# Patient Record
Sex: FEMALE | Race: WHITE | Employment: FULL TIME | URBAN - METROPOLITAN AREA
[De-identification: names, ages, dates, MRNs, and addresses within clinical notes are randomized per-mention and may not be internally consistent; named-entity substitution may affect disease eponyms.]

---

## 2020-09-11 ENCOUNTER — HOSPITAL ENCOUNTER (OUTPATIENT)
Dept: GENERAL RADIOLOGY | Age: 30
Discharge: HOME | End: 2020-09-11
Attending: FAMILY MEDICINE
Payer: COMMERCIAL

## 2020-09-11 DIAGNOSIS — R05.9 COUGH: ICD-10-CM

## 2020-09-11 PROCEDURE — 71046 X-RAY EXAM CHEST 2 VIEWS: CPT

## 2021-05-25 ENCOUNTER — OFFICE VISIT (OUTPATIENT)
Dept: ENDOCRINOLOGY | Age: 31
End: 2021-05-25
Payer: COMMERCIAL

## 2021-05-25 VITALS
WEIGHT: 171.4 LBS | OXYGEN SATURATION: 98 % | BODY MASS INDEX: 27.55 KG/M2 | HEART RATE: 76 BPM | SYSTOLIC BLOOD PRESSURE: 104 MMHG | DIASTOLIC BLOOD PRESSURE: 70 MMHG | HEIGHT: 66 IN | RESPIRATION RATE: 16 BRPM

## 2021-05-25 DIAGNOSIS — R76.8 ELEVATED ANTINUCLEAR ANTIBODY (ANA) LEVEL: ICD-10-CM

## 2021-05-25 DIAGNOSIS — R79.89 ELEVATED CORTISOL LEVEL: ICD-10-CM

## 2021-05-25 DIAGNOSIS — R79.89 ELEVATED SERUM FREE T4 LEVEL: ICD-10-CM

## 2021-05-25 DIAGNOSIS — R79.89 ELEVATED CORTISOL LEVEL: Primary | ICD-10-CM

## 2021-05-25 PROCEDURE — 99204 OFFICE O/P NEW MOD 45 MIN: CPT | Performed by: INTERNAL MEDICINE

## 2021-05-25 RX ORDER — MONTELUKAST SODIUM 10 MG/1
10 TABLET ORAL DAILY
COMMUNITY

## 2021-05-25 NOTE — PROGRESS NOTES
Chief Complaint   Patient presents with    New Patient    Thyroid Problem    Adrenal Problem     History of Present Illness: Graeme Leyva is a 26 y.o. female seen in self referral for discussion related to thyroid/adrenal dysfunction. Pauline Monroy initially presented in 2015 with symptoms of palpitations. At that time TSH was 0.2, free T4 was 0.9, total T3 was 243. Repeat labs done 2 weeks later showed a normal TSH of 2.2 free T4 1.15, total T3 of 76. Labs were rechecked until 3 years later at which point TSH was normal as was free T4.  1 year later in March 2019, TSH was normal as was free T4, thyroperoxidase and thyroglobulin antibodies were normal but she was begun on a combination T3/T4- gave her palpitations- 2.5mcg T3 15mcg of T4 04/2019.    2019 Saw functional medicine doctor who told her adrenals were low. Was never told how to get out of the cycle of fatigue, hair loss, cold extremities. July 2020, TSH was normal with a high T4 at 2.11.  TSI was normal in 2015. Repeat TSH in September 2020 was normal.    In August of 2019 saw [facility]- recommended Endo. Joint pains in the hands- typing on the computer. Has never taken biotin or iron. Went vegan in 2019, worked at first then energy worsened. Sleep is hit or miss- taking melatonin- 2mg. Goes to sleep at 9AM wakes up at 0700- wakes up at [facility] back to sleep. NO snoring. Does not wake up feeling rested. History reviewed. No pertinent past medical history. History reviewed. No pertinent surgical history. Current Outpatient Medications   Medication Sig    montelukast (Singulair) 10 mg tablet Take 10 mg by mouth daily. No current facility-administered medications for this visit.    biotin     No Known Allergies  Family History   Problem Relation Age of Onset    Hypertension Mother     Heart Disease Mother     Heart Attack Father     Anemia Father     No Known Problems Sister    Grandmother with Hashimoto's     Social Hx: Works at a [employer] job  From [employer] [facility]:  - Constitutional Symptoms: Severe fatigue  - Eyes: no blurry vision or double vision  - Cardiovascular: no chest pain or palpitations  - Respiratory: no cough or shortness of breath  - Gastrointestinal: no dysphagia or abdominal pain  - Musculoskeletal: Has joint pain in the hands  - Integumentary: Hair changes  - Neurological: no numbness, tingling, or headaches  - Psychiatric: no depression or anxiety  - Endocrine: no heat or cold intolerance, no polyuria or polydipsia    - Physical Examination:  Visit Vitals  /70   Pulse 76   Resp 16   Ht 5' 6\" (1.676 m)   Wt 171 lb 6.4 oz (77.7 kg)   SpO2 98%   BMI 27.66 kg/m²   -   - - GENERAL: NCAT, Appears well nourished   - EYES: EOMI, non-icteric, no proptosis   - Ear/Nose/Throat: NCAT, no visible inflammation or masses   - CARDIOVASCULAR: no cyanosis, no visible JVD   - RESPIRATORY: respiratory effort normal without any distress or labored breathing   - MUSCULOSKELETAL: Normal ROM of neck and upper extremities observed   - SKIN: No rash on face  - NEUROLOGIC:  No facial asymmetry (Cranial nerve 7 motor function), No gaze palsy   - PSYCHIATRIC: Normal affect, Normal insight and judgement     Data Reviewed: Please see scanned docs for full laboratory evaluation abnormalities include an CON titer of 1:640 homogeneous and speckled  Random a.m. cortisol of 20, iron level of 146 mcg/dL with normal ferritin  Multiple rheumatologic labs    Assessment/Plan: This is a very pleasant 58-year-old female with a past medical history significant for chronic fatigue seen in referral from ENT for discussion related to thyroid function tests and adrenal functioning. We discussed the fact that she likely had a thyroiditis in 2015 that resolved quickly. She has neither thyroperoxidase nor thyroid-stimulating immunoglobulin levels making either Hashimoto's or Graves' disease very unlikely albeit not impossible.   Will definitively rule out excess cortisol with a 24-hour urine cortisol level. I cannot explain the elevated CON titer or the elevated iron levels. Ferritin was normal.  There is no family history of hemochromatosis. Hepatic function test are normal.      1. Elevated cortisol level  -Do not expect to see elevated 24-hour urine cortisol level  - CORTISOL, URINE FREE 24 HR; Future  - CREATININE, UR, 24 HR; Future    2. Elevated serum free T4 level  -On one occasion in the setting of a normal TSH in September, reassess  - THYROID PEROXIDASE (TPO) AB; Future  - THYROGLOBULIN AB; Future  - THYROID STIMULATING IMMUNOGLOBULIN; Future  - T4, FREE; Future  - T3 TOTAL; Future    3. Elevated antinuclear antibody (CON) level  -Etiology is very unclear at this time, explained that this is a nonspecific lab  - FERRITIN; Future  - REFERRAL TO RHEUMATOLOGY; Future    Copy sent to:Carmella Butler MD    Return to care: August 6 at [address] [place] [address]       An electronic signature was used to authenticate this note.

## 2021-05-26 DIAGNOSIS — R76.8 ELEVATED ANTINUCLEAR ANTIBODY (ANA) LEVEL: Primary | ICD-10-CM

## 2021-05-26 LAB
FERRITIN SERPL-MCNC: 87 NG/ML (ref 15–150)
FERRITIN SERPL-MCNC: 87 NG/ML (ref 15–150)
T3 SERPL-MCNC: 95 NG/DL (ref 71–180)
T3 SERPL-MCNC: 95 NG/DL (ref 71–180)
T4 FREE SERPL-MCNC: 1.29 NG/DL (ref 0.82–1.77)
T4 FREE SERPL-MCNC: 1.29 NG/DL (ref 0.82–1.77)
THYROGLOB AB SERPL-ACNC: <1 IU/ML (ref 0–0.9)
THYROGLOB AB SERPL-ACNC: <1 IU/ML (ref 0–0.9)
THYROPEROXIDASE AB SERPL-ACNC: <9 IU/ML (ref 0–34)
THYROPEROXIDASE AB SERPL-ACNC: <9 IU/ML (ref 0–34)
TSI SER-ACNC: <0.1 IU/L (ref 0–0.55)
TSI SER-ACNC: NORMAL

## 2021-05-27 ENCOUNTER — TRANSCRIBE ORDER (OUTPATIENT)
Dept: SCHEDULING | Age: 31
End: 2021-05-27

## 2021-05-27 ENCOUNTER — DOCUMENTATION ONLY (OUTPATIENT)
Dept: ENDOCRINOLOGY | Age: 31
End: 2021-05-27

## 2021-05-27 DIAGNOSIS — K21.9 ESOPHAGEAL REFLUX: Primary | ICD-10-CM

## 2021-05-31 LAB
CORTIS F 24H UR-MRATE: 21 UG/24 HR (ref 6–42)
CORTIS F UR-MCNC: 7 UG/L
CREAT 24H UR-MRATE: 942 MG/24 HR (ref 800–1800)
CREAT UR-MCNC: 31.4 MG/DL

## 2021-06-09 ENCOUNTER — HOSPITAL ENCOUNTER (OUTPATIENT)
Dept: GENERAL RADIOLOGY | Age: 31
Discharge: HOME | End: 2021-06-09
Attending: OTOLARYNGOLOGY
Payer: COMMERCIAL

## 2021-06-09 DIAGNOSIS — K21.9 ESOPHAGEAL REFLUX: ICD-10-CM

## 2021-06-09 PROCEDURE — 74220 X-RAY XM ESOPHAGUS 1CNTRST: CPT

## 2023-05-15 RX ORDER — MONTELUKAST SODIUM 10 MG/1
10 TABLET ORAL DAILY
COMMUNITY